# Patient Record
Sex: FEMALE | Race: WHITE | NOT HISPANIC OR LATINO | Employment: OTHER | URBAN - METROPOLITAN AREA
[De-identification: names, ages, dates, MRNs, and addresses within clinical notes are randomized per-mention and may not be internally consistent; named-entity substitution may affect disease eponyms.]

---

## 2017-12-29 ENCOUNTER — OFFICE VISIT (OUTPATIENT)
Dept: GYNECOLOGIC ONCOLOGY | Facility: CLINIC | Age: 72
End: 2017-12-29
Payer: MEDICARE

## 2017-12-29 VITALS
DIASTOLIC BLOOD PRESSURE: 68 MMHG | WEIGHT: 138 LBS | HEART RATE: 63 BPM | BODY MASS INDEX: 20.98 KG/M2 | SYSTOLIC BLOOD PRESSURE: 148 MMHG

## 2017-12-29 DIAGNOSIS — Z93.3 COLOSTOMY STATUS: ICD-10-CM

## 2017-12-29 DIAGNOSIS — Z01.419 WELL WOMAN EXAM WITH ROUTINE GYNECOLOGICAL EXAM: ICD-10-CM

## 2017-12-29 DIAGNOSIS — C52 VAGINAL CANCER: Primary | ICD-10-CM

## 2017-12-29 DIAGNOSIS — Z12.89 ENCOUNTER FOR PELVIC SCREENING FOR CANCER: ICD-10-CM

## 2017-12-29 DIAGNOSIS — K46.9 PERISTOMAL HERNIA: ICD-10-CM

## 2017-12-29 PROCEDURE — 99999 PR PBB SHADOW E&M-EST. PATIENT-LVL III: CPT | Mod: PBBFAC,,, | Performed by: OBSTETRICS & GYNECOLOGY

## 2017-12-29 PROCEDURE — G0101 CA SCREEN;PELVIC/BREAST EXAM: HCPCS | Mod: PBBFAC | Performed by: OBSTETRICS & GYNECOLOGY

## 2017-12-29 PROCEDURE — G0101 CA SCREEN;PELVIC/BREAST EXAM: HCPCS | Mod: S$PBB,,, | Performed by: OBSTETRICS & GYNECOLOGY

## 2017-12-29 PROCEDURE — 99213 OFFICE O/P EST LOW 20 MIN: CPT | Mod: PBBFAC | Performed by: OBSTETRICS & GYNECOLOGY

## 2017-12-29 NOTE — PROGRESS NOTES
Subjective:       Patient ID: Tye Diaz is a 72 y.o. female.    Chief Complaint: Vaginal Cancer (f/u)    HPI     The patient comes in today for followup of her vaginal cancer as well as her annual well woman exam.  Last exam was 2016.      Mammogram: Oct 2016: normal   Colonoscopy: years ago and was incomplete. Sent to [facility] for another colonoscopy that could not be completed due to kink in bowel.         Her oncologic history is:  This is a 66-year-old patient last seen in August 2010. She has a history of vaginal carcinoma treated with radiation therapy. Subsequently she developed a recurrence and underwent a posterior pelvic examination with removal of the vagina, rectum and creation of a neovagina with Gracilis myocutaneous flaps and a colostomy. An attempt was made to save her bladder at that time but subsequently this had to be resected because of poor function and she had a continent conduit done in 1998.     Review of Systems   Constitutional: Negative for chills, fatigue and fever.   Eyes: Negative for visual disturbance.   Respiratory: Negative for cough, shortness of breath and wheezing.    Cardiovascular: Negative for chest pain, palpitations and leg swelling.   Gastrointestinal: Negative for abdominal distention, abdominal pain, constipation, diarrhea, nausea and vomiting.   Genitourinary: Negative for difficulty urinating, dysuria, frequency, genital sores, hematuria, pelvic pain, urgency, vaginal bleeding, vaginal discharge and vaginal pain.   Musculoskeletal: Negative for gait problem and neck stiffness.   Skin: Negative for rash.   Neurological: Negative for seizures and weakness.   Hematological: Negative for adenopathy. Does not bruise/bleed easily.   Psychiatric/Behavioral: The patient is not nervous/anxious.        Objective:   BP (!) 148/68   Pulse 63   Wt 62.6 kg (138 lb)   BMI 20.98 kg/m²      Physical Exam   Constitutional: She is oriented to person, place, and time. She  appears well-developed and well-nourished.   HENT:   Head: Normocephalic and atraumatic.   Eyes: No scleral icterus.   Neck: No tracheal deviation present. No thyroid mass and no thyromegaly present.   Cardiovascular: Normal rate and regular rhythm.    Pulmonary/Chest: Effort normal and breath sounds normal. She has no wheezes. Right breast exhibits no mass, no nipple discharge, no skin change and no tenderness. Left breast exhibits no mass, no nipple discharge, no skin change and no tenderness.   Abdominal: She exhibits no distension and no mass. There is no hepatosplenomegaly. There is no tenderness. There is no rebound and no guarding.   Peristomal hernia for colostomy in LLQ.   Continent conduit stoma in RLQ.    Genitourinary:   Genitourinary Comments: Bimanual exam:  Vulva: surgical changes consistent with neovagina.   Urethra: Normal size and location. No lesions  Bladder: absent   Vagina: neovagina   Cervix: absent.   Uterus: absent.  Adnexa: unable to assess.  Rectal: absent   Musculoskeletal: She exhibits no edema or tenderness.   Lymphadenopathy:     She has no cervical adenopathy.     She has no axillary adenopathy.        Right: No inguinal and no supraclavicular adenopathy present.        Left: No inguinal and no supraclavicular adenopathy present.   Neurological: She is alert and oriented to person, place, and time.   Skin: Skin is warm and dry. No rash noted.   Psychiatric: She has a normal mood and affect. Her behavior is normal. Judgment and thought content normal.       Assessment:       1. Vaginal cancer    2. Well woman exam with routine gynecological exam    3. Encounter for pelvic screening for cancer    4. Colostomy status    5. Peristomal hernia        Plan:   Vaginal cancer   MATTIE   RTC in 1 year.     Well woman exam with routine gynecological exam  Counseling time of 10 minutes discussing calcium, vitamin D and exercise. Questions answered.   She will get mammogram in [place]    Encounter for pelvic screening for cancer    Colostomy status    Peristomal hernia  -     Ambulatory consult to Colorectal Surgery

## 2018-01-26 ENCOUNTER — OFFICE VISIT (OUTPATIENT)
Dept: SURGERY | Facility: CLINIC | Age: 73
End: 2018-01-26
Payer: MEDICARE

## 2018-01-26 VITALS
DIASTOLIC BLOOD PRESSURE: 78 MMHG | BODY MASS INDEX: 21.02 KG/M2 | HEART RATE: 57 BPM | SYSTOLIC BLOOD PRESSURE: 157 MMHG | WEIGHT: 138.69 LBS | HEIGHT: 68 IN

## 2018-01-26 DIAGNOSIS — K94.09 COLOSTOMY HERNIA: ICD-10-CM

## 2018-01-26 DIAGNOSIS — K43.5 PARASTOMAL HERNIA WITHOUT OBSTRUCTION OR GANGRENE: Primary | ICD-10-CM

## 2018-01-26 DIAGNOSIS — Z85.44 HISTORY OF CANCER OF VAGINA: ICD-10-CM

## 2018-01-26 PROCEDURE — 1159F MED LIST DOCD IN RCRD: CPT | Mod: ,,, | Performed by: COLON & RECTAL SURGERY

## 2018-01-26 PROCEDURE — 99213 OFFICE O/P EST LOW 20 MIN: CPT | Mod: PBBFAC | Performed by: COLON & RECTAL SURGERY

## 2018-01-26 PROCEDURE — 1126F AMNT PAIN NOTED NONE PRSNT: CPT | Mod: ,,, | Performed by: COLON & RECTAL SURGERY

## 2018-01-26 PROCEDURE — 99203 OFFICE O/P NEW LOW 30 MIN: CPT | Mod: S$PBB,,, | Performed by: COLON & RECTAL SURGERY

## 2018-01-26 PROCEDURE — 99999 PR PBB SHADOW E&M-EST. PATIENT-LVL III: CPT | Mod: PBBFAC,,, | Performed by: COLON & RECTAL SURGERY

## 2018-01-26 NOTE — PROGRESS NOTES
COLORECTAL SURGERY  H&P      REASON FOR CONSULT:    Encounter Diagnoses   Name Primary?    Parastomal hernia without obstruction or gangrene Yes    History of cancer of vagina     Colostomy hernia        SUBJECTIVE:     HISTORY OF PRESENT ILLNESS:  Tye Diaz is a 72 y.o. female who has been referred to surgery clinic for parastomal hernia at site of prior colostomy.    Patient has a history of vaginal carcinoma treated with radiation therapy. Subsequently she developed a recurrence and underwent a posterior pelvic examination with removal of the vagina, rectum and creation of a neovagina with Gracilis myocutaneous flaps and a colostomy. An attempt was made to save her bladder at that time but subsequently this had to be resected because of poor function and she had a continent conduit done in 1998.    She presents to discuss worsening parastomal hernia.  Very much bothered by increasing bulge.  She has gone up a pants size.  No issues with constipation.  Regularly irrigates colostomy.     The patient's past surgical history is pertinent for prior pelvic exenteration with creation of neovagina and left end colostomy.  [place] pouch created 7 months later with catheterizable stoma right lower abdomen.  In 2009, she had overlay Permacol mesh repair by Dr. Moreno.      MEDICATIONS:  Home Medications:  Current Outpatient Prescriptions on File Prior to Visit   Medication Sig Dispense Refill    alprazolam (XANAX) 0.5 MG tablet 0.5 mg nightly as needed. Takes 1/2 tablets prn       amlodipine-benazepril 5-20 mg (LOTREL) 5-20 mg per capsule Take 1 capsule by mouth once daily.      calcium-vitamin D3 (CALCIUM 500 + D) 500 mg(1,250mg) -200 unit per tablet Take 1 tablet by mouth once daily.       No current facility-administered medications on file prior to visit.        ALLERGIES:    Review of patient's allergies indicates:  No Known Allergies    PAST MEDICAL HISTORY:    Past Medical History:   Diagnosis Date     Cancer     vaginal cancer    Cataract     Hypertension     Inflammatory bowel disease     Kidney stone     Peristomal hernia 12/29/2017    Urinary tract infection     Vaginal cancer 1993    radiation treatment for clear cell cancer    Vaginal infection     Vitamin D deficiency disease     Well woman exam with routine gynecological exam 5/12/2016       SURGICAL HISTORY:  Past Surgical History:   Procedure Laterality Date    APPENDECTOMY      BLADDER SURGERY      CATARACT EXTRACTION Bilateral     COLOSTOMY      CYSTOSCOPY      HAND TENDON SURGERY Right     HERNIA REPAIR  8/2009    peristomal     hyperbarics      HYSTERECTOMY  1980's    with unilateral salpingo oophrectomy    KIDNEY STONE SURGERY      OOPHORECTOMY      pelvic exenteration      AL REMOVAL OF OVARY/TUBE(S)      2yrs after judy/uso/other ovary was removed    radiation      SMALL INTESTINE SURGERY      URETERAL STENT PLACEMENT  1998       FAMILY HISTORY:  Family History   Problem Relation Age of Onset    Colon cancer Mother 50    Ovarian cancer Neg Hx     Uterine cancer Neg Hx     Breast cancer Neg Hx        SOCIAL HISTORY:  Social History   Substance Use Topics    Smoking status: Current Some Day Smoker     Packs/day: 0.25     Years: 34.00    Smokeless tobacco: Not on file    Alcohol use 0.6 oz/week     1 Shots of liquor per week      Comment: daily        REVIEW OF SYSTEMS:  A 10-point review of systems is negative except for the above mentioned in the HPI.    OBJECTIVE:     Most Recent Vitals:  Pulse: (!) 57 (01/26/18 1029)  BP: (!) 157/78 (01/26/18 1029)      PHYSICAL EXAM:  AAO, NAD, well developed and well nourished.  Head normocephalic, atraumatic.  Trachea midline, neck supple.  Respirations unlabored with good inspiratory effort.  Heart regular rate and rhythm.  Abdomen soft, nondistended, nontender to palpation.  Reducible bulge at left lower quadrant stoma consistent with parastomal hernia.       LABORATORY  VALUES:  Lab Results   Component Value Date    WBC 7.16 07/22/2009    HGB 13.9 07/22/2009    HCT 41.3 07/22/2009     07/22/2009     Lab Results   Component Value Date     07/22/2009    K 4.2 07/22/2009     07/22/2009    CO2 22 (L) 07/22/2009    BUN 27 (H) 07/22/2009    CREATININE 1.0 05/12/2016    GLU 86 07/22/2009    CALCIUM 10.2 07/22/2009    AST 20 07/22/2009    ALT 16 07/22/2009    ALKPHOS 69 07/22/2009    BILITOT 0.3 07/22/2009    PROT 7.7 07/22/2009    ALBUMIN 4.9 07/22/2009     No results found for: INR, PTT  No results found for: TSH, FREET4, PTH, CEA,       DIAGNOSTIC STUDIES:  Old CT Reviewed    ASSESSMENT:     Tye Diaz is a 72 y.o. female referred for parastomal hernia at left lower quadrant end colostomy.      PLAN:  · Referral to Dr. Montoya for consideration of sugarbaker repair.   · The patient is not on anticoagulation, Aspirin or other antiplatelet medications.      Gladys Powers MD      Orders Placed This Encounter    Ambulatory Referral to General Surgery

## 2018-01-26 NOTE — LETTER
January 31, 2018      Ernesto Avelar MD  [address]  [facility] [address]           [facility]  [address]  [facility] [address]  Phone: [phone]          Patient: Tye Diaz   MR Number: [id number]   YOB: 1945   Date of Visit: 1/26/2018       Dear Dr. Ernesto Avelar:    Thank you for referring Tye Diaz to me for evaluation. Attached you will find relevant portions of my assessment and plan of care.    If you have questions, please do not hesitate to call me. I look forward to following yTe Diaz along with you.    Sincerely,    Eduardo Muniz MD    Enclosure  CC:  No Recipients    If you would like to receive this communication electronically, please contact [email or web address] or [phone] to request more information on [employer] Link access.    For providers and/or their staff who would like to refer a patient to [facility], please contact us through our one-stop-shop provider referral line, [facility], at [phone].    If you feel you have received this communication in error or would no longer like to receive these types of communications, please e-mail [email or web address]

## 2018-02-16 ENCOUNTER — OFFICE VISIT (OUTPATIENT)
Dept: SURGERY | Facility: CLINIC | Age: 73
End: 2018-02-16
Payer: MEDICARE

## 2018-02-16 VITALS
BODY MASS INDEX: 20.38 KG/M2 | SYSTOLIC BLOOD PRESSURE: 155 MMHG | TEMPERATURE: 98 F | WEIGHT: 134.5 LBS | DIASTOLIC BLOOD PRESSURE: 72 MMHG | HEIGHT: 68 IN | HEART RATE: 60 BPM

## 2018-02-16 DIAGNOSIS — K43.5 PARASTOMAL HERNIA WITHOUT OBSTRUCTION OR GANGRENE: Primary | ICD-10-CM

## 2018-02-16 PROCEDURE — 1159F MED LIST DOCD IN RCRD: CPT | Mod: ,,, | Performed by: SURGERY

## 2018-02-16 PROCEDURE — 99999 PR PBB SHADOW E&M-EST. PATIENT-LVL III: CPT | Mod: PBBFAC,,, | Performed by: SURGERY

## 2018-02-16 PROCEDURE — 1126F AMNT PAIN NOTED NONE PRSNT: CPT | Mod: ,,, | Performed by: SURGERY

## 2018-02-16 PROCEDURE — 99204 OFFICE O/P NEW MOD 45 MIN: CPT | Mod: S$PBB,,, | Performed by: SURGERY

## 2018-02-16 PROCEDURE — 99213 OFFICE O/P EST LOW 20 MIN: CPT | Mod: PBBFAC | Performed by: SURGERY

## 2018-02-16 NOTE — PROGRESS NOTES
History & Physical    SUBJECTIVE:     History of Present Illness:  HISTORY OF PRESENT ILLNESS:  Tye Diaz is a 72 y.o. female who has been referred to surgery clinic for parastomal hernia at site of prior colostomy.     Patient has a history of vaginal carcinoma treated with radiation therapy. Subsequently she developed a recurrence and underwent a posterior pelvic surgery with removal of the vagina, rectum and creation of a neovagina with Gracilis myocutaneous flaps and a colostomy. An attempt was made to save her bladder at that time but this had to be resected because of poor function and she had a continent conduit done in 1998.     She presents to discuss parastomal hernia.  Very much bothered by appearance of increasing bulge.  She has gone up a pants size.  No issues with constipation.  Regularly irrigates colostomy. No pain from hernia.  No trouble with bag placement and no obstruction.     The patient's past surgical history is pertinent for prior pelvic exenteration with creation of neovagina and left end colostomy.  [place] pouch created 7 months later with catheterizable stoma right lower abdomen.  In 2009, she had overlay Permacol mesh repair by Dr. Moreno.      Review of patient's allergies indicates:  No Known Allergies    Current Outpatient Prescriptions   Medication Sig Dispense Refill    alprazolam (XANAX) 0.5 MG tablet 0.5 mg nightly as needed. Takes 1/2 tablets prn       amlodipine-benazepril 5-20 mg (LOTREL) 5-20 mg per capsule Take 1 capsule by mouth once daily.      calcium-vitamin D3 (CALCIUM 500 + D) 500 mg(1,250mg) -200 unit per tablet Take 1 tablet by mouth once daily.       No current facility-administered medications for this visit.        Past Medical History:   Diagnosis Date    Cancer     vaginal cancer    Cataract     Hypertension     Inflammatory bowel disease     Kidney stone     Peristomal hernia 12/29/2017    Urinary tract infection     Vaginal cancer 1993     radiation treatment for clear cell cancer    Vaginal infection     Vitamin D deficiency disease     Well woman exam with routine gynecological exam 5/12/2016     Past Surgical History:   Procedure Laterality Date    APPENDECTOMY      BLADDER SURGERY      CATARACT EXTRACTION Bilateral     COLOSTOMY      CYSTOSCOPY      HAND TENDON SURGERY Right     HERNIA REPAIR  8/2009    peristomal     hyperbarics      HYSTERECTOMY  1980's    with unilateral salpingo oophrectomy    KIDNEY STONE SURGERY      OOPHORECTOMY      pelvic exenteration      ND REMOVAL OF OVARY/TUBE(S)      2yrs after judy/uso/other ovary was removed    radiation      SMALL INTESTINE SURGERY      URETERAL STENT PLACEMENT  1998     Family History   Problem Relation Age of Onset    Colon cancer Mother 50    Ovarian cancer Neg Hx     Uterine cancer Neg Hx     Breast cancer Neg Hx      Social History   Substance Use Topics    Smoking status: Current Some Day Smoker     Packs/day: 0.25     Years: 34.00    Smokeless tobacco: Not on file    Alcohol use 0.6 oz/week     1 Shots of liquor per week      Comment: daily        Review of Systems:  Review of Systems   Constitutional: Negative for activity change, appetite change, chills, diaphoresis, fatigue and fever.   HENT: Negative for congestion, postnasal drip, rhinorrhea, sinus pressure, sneezing, sore throat and tinnitus.    Eyes: Negative for photophobia, pain, discharge, redness, itching and visual disturbance.   Respiratory: Negative for apnea, cough, choking, chest tightness, shortness of breath, wheezing and stridor.    Cardiovascular: Negative for chest pain, palpitations and leg swelling.   Gastrointestinal: Negative for abdominal distention, abdominal pain, constipation, diarrhea, nausea and vomiting.   Musculoskeletal: Negative for arthralgias, back pain and joint swelling.   Skin: Negative for color change, pallor and rash.   Neurological: Negative for dizziness, facial  asymmetry, light-headedness, numbness and headaches.   Hematological: Negative for adenopathy.   Psychiatric/Behavioral: Negative for agitation, behavioral problems, confusion and decreased concentration.       OBJECTIVE:     Vital Signs (Most Recent)              Physical Exam:  Physical Exam   Constitutional: She is oriented to person, place, and time. She appears well-developed and well-nourished.   HENT:   Head: Normocephalic and atraumatic.   Right Ear: External ear normal.   Left Ear: External ear normal.   Eyes: Conjunctivae and EOM are normal. Right eye exhibits no discharge. Left eye exhibits no discharge. No scleral icterus.   Neck: Normal range of motion. Neck supple.   Cardiovascular: Normal rate and regular rhythm.    Pulmonary/Chest: Effort normal. No respiratory distress.   Abdominal: Soft. She exhibits no distension and no mass. There is no tenderness. There is no rebound and no guarding. A hernia (parastomal.  Non tender.  Reducibel.) is present.   Musculoskeletal: Normal range of motion. She exhibits no edema or tenderness.   Neurological: She is alert and oriented to person, place, and time.   Skin: Skin is warm and dry. No rash noted. No erythema. No pallor.   Psychiatric: She has a normal mood and affect. Her behavior is normal. Judgment and thought content normal.         ASSESSMENT/PLAN:     Parastomal Hernia    PLAN:Plan     Currently no sig issues requiring surgery  Would be difficult surgery secondary to previous radiation and surgeries with second ostomy on other side.  Discussed this with patient and high rate of recurrence in these parastomal hernias.  Will obtain CT abd/pelvis  Will suggest binder with hole for ostomy appliance.  RTC with any issues or problems.         Lalit Montoya MD

## 2018-02-16 NOTE — LETTER
February 16, 2018      Eduardo Muniz MD  [address] [facility] [address]           [facility]  [address]  [facility] [address]  Phone: [phone]          Patient: Tye Diaz   MR Number: [id number]   YOB: 1945   Date of Visit: 2/16/2018       Dear Dr. Eduardo Muniz:    Thank you for referring Tye Diaz to me for evaluation. Attached you will find relevant portions of my assessment and plan of care.    If you have questions, please do not hesitate to call me. I look forward to following Tye Diaz along with you.    Sincerely,    Lalit Montoya Jr., MD    Enclosure  CC:  No Recipients    If you would like to receive this communication electronically, please contact [email or web address] or [phone] to request more information on [employer] [facility] Link access.    For providers and/or their staff who would like to refer a patient to [facility], please contact us through our one-stop-shop provider referral line, [facility] , at [phone].    If you feel you have received this communication in error or would no longer like to receive these types of communications, please e-mail [email or web address]

## 2018-02-21 ENCOUNTER — TELEPHONE (OUTPATIENT)
Dept: SURGERY | Facility: CLINIC | Age: 73
End: 2018-02-21

## 2018-02-21 NOTE — TELEPHONE ENCOUNTER
Left VM for pt to return phone call in reference to scheduling her CT appt and an appt with the ostomy nurse.

## 2021-01-27 ENCOUNTER — IMMUNIZATION (OUTPATIENT)
Dept: PRIMARY CARE CLINIC | Facility: CLINIC | Age: 76
End: 2021-01-27
Payer: MEDICARE

## 2021-01-27 DIAGNOSIS — Z23 NEED FOR VACCINATION: Primary | ICD-10-CM

## 2021-01-27 PROCEDURE — 91300 COVID-19, MRNA, LNP-S, PF, 30 MCG/0.3 ML DOSE VACCINE: CPT | Mod: S$GLB,,, | Performed by: FAMILY MEDICINE

## 2021-01-27 PROCEDURE — 0001A COVID-19, MRNA, LNP-S, PF, 30 MCG/0.3 ML DOSE VACCINE: ICD-10-PCS | Mod: S$GLB,,, | Performed by: FAMILY MEDICINE

## 2021-01-27 PROCEDURE — 0001A COVID-19, MRNA, LNP-S, PF, 30 MCG/0.3 ML DOSE VACCINE: CPT | Mod: S$GLB,,, | Performed by: FAMILY MEDICINE

## 2021-01-27 PROCEDURE — 91300 COVID-19, MRNA, LNP-S, PF, 30 MCG/0.3 ML DOSE VACCINE: ICD-10-PCS | Mod: S$GLB,,, | Performed by: FAMILY MEDICINE

## 2021-02-17 ENCOUNTER — IMMUNIZATION (OUTPATIENT)
Dept: PRIMARY CARE CLINIC | Facility: CLINIC | Age: 76
End: 2021-02-17
Payer: MEDICARE

## 2021-02-17 DIAGNOSIS — Z23 NEED FOR VACCINATION: Primary | ICD-10-CM

## 2021-02-17 PROCEDURE — 0002A COVID-19, MRNA, LNP-S, PF, 30 MCG/0.3 ML DOSE VACCINE: CPT | Mod: S$GLB,,, | Performed by: FAMILY MEDICINE

## 2021-02-17 PROCEDURE — 0002A COVID-19, MRNA, LNP-S, PF, 30 MCG/0.3 ML DOSE VACCINE: ICD-10-PCS | Mod: S$GLB,,, | Performed by: FAMILY MEDICINE

## 2021-02-17 PROCEDURE — 91300 COVID-19, MRNA, LNP-S, PF, 30 MCG/0.3 ML DOSE VACCINE: CPT | Mod: S$GLB,,, | Performed by: FAMILY MEDICINE

## 2021-02-17 PROCEDURE — 91300 COVID-19, MRNA, LNP-S, PF, 30 MCG/0.3 ML DOSE VACCINE: ICD-10-PCS | Mod: S$GLB,,, | Performed by: FAMILY MEDICINE

## 2021-10-12 ENCOUNTER — IMMUNIZATION (OUTPATIENT)
Dept: FAMILY MEDICINE | Facility: CLINIC | Age: 76
End: 2021-10-12
Payer: MEDICARE

## 2021-10-12 DIAGNOSIS — Z23 NEED FOR VACCINATION: Primary | ICD-10-CM

## 2021-10-12 PROCEDURE — 0003A COVID-19, MRNA, LNP-S, PF, 30 MCG/0.3 ML DOSE VACCINE: CPT | Mod: PBBFAC | Performed by: FAMILY MEDICINE

## 2021-10-12 PROCEDURE — 91300 COVID-19, MRNA, LNP-S, PF, 30 MCG/0.3 ML DOSE VACCINE: CPT | Mod: PBBFAC

## 2022-04-30 ENCOUNTER — HOSPITAL ENCOUNTER (EMERGENCY)
Facility: HOSPITAL | Age: 77
Discharge: HOME | End: 2022-04-30
Attending: EMERGENCY MEDICINE
Payer: MEDICARE

## 2022-04-30 VITALS
SYSTOLIC BLOOD PRESSURE: 108 MMHG | BODY MASS INDEX: 21.82 KG/M2 | DIASTOLIC BLOOD PRESSURE: 59 MMHG | TEMPERATURE: 98 F | RESPIRATION RATE: 15 BRPM | WEIGHT: 144 LBS | OXYGEN SATURATION: 96 % | HEIGHT: 68 IN | HEART RATE: 62 BPM
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
## 2022-04-30 DIAGNOSIS — S43.401A SPRAIN OF RIGHT SHOULDER, UNSPECIFIED SHOULDER SPRAIN TYPE, INITIAL ENCOUNTER: Primary | ICD-10-CM

## 2022-04-30 PROCEDURE — 99284 EMERGENCY DEPT VISIT MOD MDM: CPT | Mod: 25

## 2022-04-30 PROCEDURE — 25000003 PHARM REV CODE 250: Performed by: EMERGENCY MEDICINE

## 2022-04-30 PROCEDURE — 73030 X-RAY EXAM OF SHOULDER: CPT | Mod: 26,RT,, | Performed by: RADIOLOGY

## 2022-04-30 PROCEDURE — 73030 XR SHOULDER COMPLETE 2 OR MORE VIEWS RIGHT: ICD-10-PCS | Mod: 26,RT,, | Performed by: RADIOLOGY

## 2022-04-30 PROCEDURE — 73030 X-RAY EXAM OF SHOULDER: CPT | Mod: TC,FY,RT

## 2022-04-30 RX ORDER — HYDROCODONE BITARTRATE AND ACETAMINOPHEN 5; 325 MG/1; MG/1
1 TABLET ORAL EVERY 8 HOURS PRN
Qty: 12 TABLET | Refills: 0 | Status: SHIPPED | OUTPATIENT
Start: 2022-04-30

## 2022-04-30 RX ORDER — IBUPROFEN 400 MG/1
400 TABLET ORAL
Status: COMPLETED | OUTPATIENT
Start: 2022-04-30 | End: 2022-04-30

## 2022-04-30 RX ORDER — PREGABALIN 100 MG/1
100 CAPSULE ORAL 2 TIMES DAILY
COMMUNITY
Start: 2022-04-12

## 2022-04-30 RX ORDER — MORPHINE SULFATE 4 MG/ML
6 INJECTION, SOLUTION INTRAMUSCULAR; INTRAVENOUS
Status: DISCONTINUED | OUTPATIENT
Start: 2022-04-30 | End: 2022-04-30

## 2022-04-30 RX ORDER — HYDROCODONE BITARTRATE AND ACETAMINOPHEN 5; 325 MG/1; MG/1
1 TABLET ORAL
Status: COMPLETED | OUTPATIENT
Start: 2022-04-30 | End: 2022-04-30

## 2022-04-30 RX ORDER — GABAPENTIN 100 MG/1
CAPSULE ORAL
COMMUNITY

## 2022-04-30 RX ORDER — LEVOTHYROXINE SODIUM 50 UG/1
50 TABLET ORAL DAILY
COMMUNITY
Start: 2022-02-25

## 2022-04-30 RX ORDER — NAPROXEN 375 MG/1
375 TABLET ORAL 2 TIMES DAILY WITH MEALS
Qty: 14 TABLET | Refills: 0 | Status: SHIPPED | OUTPATIENT
Start: 2022-04-30 | End: 2022-05-07

## 2022-04-30 RX ADMIN — IBUPROFEN 400 MG: 400 TABLET, FILM COATED ORAL at 01:04

## 2022-04-30 RX ADMIN — HYDROCODONE BITARTRATE AND ACETAMINOPHEN 1 TABLET: 5; 325 TABLET ORAL at 01:04

## 2022-04-30 NOTE — ED PROVIDER NOTES
CHIEF COMPLAINT    Chief Complaint   Patient presents with    Shoulder Pain     S/p trip and fall off of one step at aprox 2000       HPI    Tye Diaz is a 77 y.o. female who presents after a trip and fall tonight around 8:00 p.m. where she fell and hurt her right shoulder.  Complaining of pain mainly in the right upper arm and shoulder.  Denies any her head, no loss of conscious.  Denies any neck pain, chest pain, shortness of breath, abdominal pain, back pain, hip pain.  Denies any significant pain in the right wrist or elbow.  The severity of the symptoms is moderate.    CURRENT MEDICATIONS    Prior to Admission medications    Medication Sig Start Date End Date Taking? Authorizing Provider   amlodipine-benazepril 5-20 mg (LOTREL) 5-20 mg per capsule Take 1 capsule by mouth once daily.   Yes Historical Provider   calcium-vitamin D3 (OS-BLOSSOM 500 + D3) 500 mg-5 mcg (200 unit) per tablet Take 1 tablet by mouth once daily.   Yes Historical Provider   gabapentin (NEURONTIN) 100 MG capsule gabapentin 100 mg capsule   Take 1 capsule 3 times a day by oral route.   Yes Historical Provider   pregabalin (LYRICA) 100 MG capsule Take 100 mg by mouth 2 (two) times daily. 4/12/22  Yes Historical Provider   SYNTHROID 50 mcg tablet Take 50 mcg by mouth once daily. 2/25/22  Yes Historical Provider   alprazolam (XANAX) 0.5 MG tablet 0.5 mg nightly as needed. Takes 1/2 tablets prn    Historical Provider       ALLERGIES    Review of patient's allergies indicates:  No Known Allergies    PAST MEDICAL HISTORY  Past Medical History:   Diagnosis Date    Cancer     vaginal cancer    Cataract     Hypertension     Inflammatory bowel disease     Kidney stone     Peristomal hernia 12/29/2017    Urinary tract infection     Vaginal cancer 1993    radiation treatment for clear cell cancer    Vaginal infection     Vitamin D deficiency disease     Well woman exam with routine gynecological exam 5/12/2016       SURGICAL HISTORY     Past Surgical History:   Procedure Laterality Date    APPENDECTOMY      BLADDER SURGERY      CATARACT EXTRACTION Bilateral     COLOSTOMY      CYSTOSCOPY      HAND TENDON SURGERY Right     HERNIA REPAIR  8/2009    peristomal     hyperbarics      HYSTERECTOMY  1980's    with unilateral salpingo oophrectomy    KIDNEY STONE SURGERY      OOPHORECTOMY      pelvic exenteration      PA REMOVAL OF OVARY/TUBE(S)      2yrs after judy/uso/other ovary was removed    radiation      SMALL INTESTINE SURGERY      URETERAL STENT PLACEMENT  1998       SOCIAL HISTORY    Social History     Socioeconomic History    Marital status:    Tobacco Use    Smoking status: Current Some Day Smoker     Packs/day: 0.25     Years: 34.00     Pack years: 8.50    Smokeless tobacco: Never Used   Substance and Sexual Activity    Alcohol use: Yes     Alcohol/week: 1.0 standard drink     Types: 1 Shots of liquor per week     Comment: daily    Drug use: No       FAMILY HISTORY    Family History   Problem Relation Age of Onset    Colon cancer Mother 50    Ovarian cancer Neg Hx     Uterine cancer Neg Hx     Breast cancer Neg Hx        REVIEW OF SYSTEMS    Constitutional:  No reported fever, chills, weight loss or weakness.   Eyes:  No reported photophobia or discharge. No visual changes  HENT:  No reported sore throat or ear pain.   Respiratory:  No reported cough or shortness of breath.   Cardiovascular:  No reported chest pain, palpitations or swelling.    GI:  No reported abdominal pain, nausea, vomiting, or diarrhea.   Musculoskeletal:  No reported back pain.   Skin:  No reported rash.   Neurologic:  No reported headache, focal weakness or sensory changes.   Endocrine:  No reported polyuria or polydypsia.   Lymphatic:  No reported swollen glands.   Psychiatric:  No reported depression, suicidal ideation or homicidal ideation.   All Systems otherwise negative except as noted in the Review of Systems and History of Present  "Illness.    PHYSICAL EXAM    VITAL SIGNS: /74 (BP Location: Left arm, Patient Position: Lying)   Pulse 62   Temp 98.1 °F (36.7 °C) (Oral)   Resp 17   Ht 5' 8" (1.727 m)   Wt 65.3 kg (144 lb)   SpO2 97%   Breastfeeding No   BMI 21.90 kg/m²    Constitutional:  Well developed, Well nourished who appears stated age, No acute distress, Non-toxic appearance.   HENT:  Normocephalic, Atraumatic, Moist mucus membranes, No oral exudates or ulcerations, Nares patent,  Normal appearing turbinates.  Neck- Normal range of motion, No tenderness, Supple, No stridor. No meningismus  Eyes:  PERRL, EOMI, Conjunctiva normal, Anicteric sclera  Respiratory: Breath sounds clear to auscultation bilaterally, No respiratory distress, No wheezing, No chest tenderness.   Cardiovascular:  Normal heart rate, Normal rhythm, No murmurs, No rubs, No gallops.   Musculoskeletal:  Intact distal pulses, No edema, No cyanosis, No clubbing. Good range of motion in all major joints. No major deformities noted.  Moderate tenderness to palpation over the right anterior shoulder, limited range of motion secondary to pain.  No significant tenderness or pain with range of motion of the right elbow or wrist.  Integument:  Warm, Dry, No erythema, No rash.   Psychiatric:  Affect normal, Judgment normal, Mood normal.     LABS  Pertinent labs reviewed. (See chart for details)   Labs Reviewed - No data to display    RADIOLOGY    Imaging Results          X-ray Shoulder 2 or More Views Right (In process)  Result time 04/30/22 00:37:05   Procedure changed from X-Ray Shoulder Trauma Right               X-rays of the right shoulder interpreted by myself shows no gross fracture or dislocation.    ED COURSE & MEDICAL DECISION MAKING    Medications   HYDROcodone-acetaminophen 5-325 mg per tablet 1 tablet (has no administration in time range)   ibuprofen tablet 400 mg (has no administration in time range)       The patient presents with the history as noted " above. The differential diagnosis would include but is not limited to:  Contusion, sprain, fracture, dislocation     Patient presents with right shoulder injury after a fall.  X-rays do not show any gross fracture or dislocation.  Patient placed in a sling for support temporarily and referred to Orthopedics for outpatient follow-up.  Discharged short course of pain medication.  ED return precautions given the patient.       FINAL IMPRESSION    1. Sprain of right shoulder, unspecified shoulder sprain type, initial encounter    2. Fall          Aditya Tamayo    The patient was discharged to home with the following prescriptions:   New Prescriptions    HYDROCODONE-ACETAMINOPHEN (NORCO) 5-325 MG PER TABLET    Take 1 tablet by mouth every 8 (eight) hours as needed for Pain.    NAPROXEN (NAPROSYN) 375 MG TABLET    Take 1 tablet (375 mg total) by mouth 2 (two) times daily with meals. for 7 days       I stressed the importance of close follow-up with:  Florentin Flood MD  [address] [place] [address]  [phone]    In 3 days      [place] - Emergency Dept  [address] [facility] [address]  [phone]    As needed, If symptoms worsen    Stef Palmer,   [address] [facility] [place] [address]  [phone]    In 1 week  As needed, If symptoms worsen      I discussed the differential diagnosis, treatment plan, and strict return precautions for any worsening symptoms or new concerning symptoms, and they stated understanding.        **Parts of this note were created using [employer] Voice Recognition software program. While efforts were made to correct any mistakes made by this voice recognition software program, nonsensical phrases may remain in this note.       Aditya Tamayo,   04/30/22 0127

## 2022-04-30 NOTE — ED NOTES
Pt arrived to ED via EMS with c/o right shoulder pain s/p slip and fall off of one step onto [place] at aprox 2000. Pt denies and head or neck trauma or LOC. Pt states she was able to get up after the fall and go home, change clothes, after a few hours she noticed she wasn't able to lift her right arm d/t shoulder pain. Pt applied ice PTA. Pt is aaox3, NAD noted - pt resting in stretcher, side rails up x2.

## 2022-04-30 NOTE — DISCHARGE INSTRUCTIONS
Ice to the shoulder as needed for pain.  Over-the-counter Tylenol for mild pain.  Use sling for support, did not keep it on all the time.

## 2022-04-30 NOTE — ED NOTES
Pt states she does not have her phone or know any phone numbers. Pt states she will call a cab to get back home. Pt placed in the lobby while she waits for a cab.

## 2023-05-29 ENCOUNTER — TELEPHONE (OUTPATIENT)
Dept: FAMILY MEDICINE | Facility: CLINIC | Age: 78
End: 2023-05-29
Payer: MEDICARE

## 2023-05-29 NOTE — TELEPHONE ENCOUNTER
Called pt to let them know that Dr. Kaufman will be leaving the practice. Offering them to see another provider to establish care or they can come in if they still need to be seen.   LVM

## 2024-04-30 ENCOUNTER — TELEPHONE (OUTPATIENT)
Dept: SURGERY | Facility: CLINIC | Age: 79
End: 2024-04-30
Payer: MEDICARE

## 2024-04-30 NOTE — TELEPHONE ENCOUNTER
Spoke with patient, referral is for parastomal hernia. Informed patient that I think she would be referred to general surgery, but I will double check with Dr Muniz and call her back.

## 2024-04-30 NOTE — TELEPHONE ENCOUNTER
----- Message from Priti Rocha sent at 4/30/2024 12:28 PM CDT -----  Regarding: Referral  Hello,    Provider Sharmila Javed NP  would like to refer the patient to Dr. Muniz.      The patients diagnosis is: parasternal hernia w/o obstruction or gangrene [K43.5]    I have scanned the patients referral and records into .     Please review and contact patient to schedule appointment. If there are no appointments available at this time, please advise and I will inform the referring provider/clinic      Thank you,   [facility]  Favio Moody

## 2024-06-13 ENCOUNTER — OFFICE VISIT (OUTPATIENT)
Dept: PODIATRY | Facility: CLINIC | Age: 79
End: 2024-06-13
Payer: MEDICARE

## 2024-06-13 VITALS
SYSTOLIC BLOOD PRESSURE: 134 MMHG | DIASTOLIC BLOOD PRESSURE: 75 MMHG | HEIGHT: 68 IN | HEART RATE: 59 BPM | BODY MASS INDEX: 21.67 KG/M2 | WEIGHT: 143 LBS

## 2024-06-13 DIAGNOSIS — M72.2 PLANTAR FASCIITIS: Primary | ICD-10-CM

## 2024-06-13 PROCEDURE — 99999 PR PBB SHADOW E&M-EST. PATIENT-LVL IV: CPT | Mod: PBBFAC,,, | Performed by: PODIATRIST

## 2024-06-13 RX ORDER — METHENAMINE HIPPURATE 1000 MG/1
TABLET ORAL
COMMUNITY
Start: 2023-12-27

## 2024-06-13 RX ORDER — BETAMETHASONE SODIUM PHOSPHATE AND BETAMETHASONE ACETATE 3; 3 MG/ML; MG/ML
12 INJECTION, SUSPENSION INTRA-ARTICULAR; INTRALESIONAL; INTRAMUSCULAR; SOFT TISSUE
Status: COMPLETED | OUTPATIENT
Start: 2024-06-13 | End: 2024-06-13

## 2024-06-13 RX ORDER — PREGABALIN 150 MG/1
150 CAPSULE ORAL
COMMUNITY
Start: 2024-04-08

## 2024-06-13 RX ORDER — BENAZEPRIL HYDROCHLORIDE AND HYDROCHLOROTHIAZIDE 20; 12.5 MG/1; MG/1
1 TABLET ORAL
COMMUNITY
Start: 2024-02-20

## 2024-06-13 RX ORDER — NAPROXEN 375 MG/1
TABLET ORAL
COMMUNITY
Start: 2023-10-17 | End: 2024-06-13

## 2024-06-13 RX ORDER — DICLOFENAC SODIUM 75 MG/1
75 TABLET, DELAYED RELEASE ORAL 2 TIMES DAILY
Qty: 60 TABLET | Refills: 0 | Status: SHIPPED | OUTPATIENT
Start: 2024-06-13 | End: 2024-07-13

## 2024-06-13 RX ORDER — CYCLOBENZAPRINE HCL 5 MG
TABLET ORAL
COMMUNITY
Start: 2024-01-23

## 2024-06-13 RX ORDER — KETOROLAC TROMETHAMINE 30 MG/ML
60 INJECTION, SOLUTION INTRAMUSCULAR; INTRAVENOUS
Status: COMPLETED | OUTPATIENT
Start: 2024-06-13 | End: 2024-06-13

## 2024-06-13 RX ADMIN — BETAMETHASONE SODIUM PHOSPHATE AND BETAMETHASONE ACETATE 12 MG: 3; 3 INJECTION, SUSPENSION INTRA-ARTICULAR; INTRALESIONAL; INTRAMUSCULAR; SOFT TISSUE at 01:06

## 2024-06-13 RX ADMIN — KETOROLAC TROMETHAMINE 60 MG: 30 INJECTION, SOLUTION INTRAMUSCULAR; INTRAVENOUS at 01:06

## 2024-06-16 NOTE — PROGRESS NOTES
Subjective:       Patient ID: Tye Diaz is a 79 y.o. female.    Chief Complaint: Heel Pain  Patient presents today with a complaint of bilateral heel pain she states this is definitely worse at night.  Patient states this has been going on for about 2-3 weeks her right foot is worse than her left she states she can not stand for any period of time.  Patient states this 1st started several months ago but it has only the past few weeks where this has gotten progressively worse she does relate a history of a fall which caused a pelvis fracture 2-1/2 years ago she is currently doing physical therapy.  Patient states she has good supportive shoes she has not sure why she is having so much pain in her heel.    Past Medical History:   Diagnosis Date    Cancer     vaginal cancer    Cataract     Hypertension     Inflammatory bowel disease     Kidney stone     Peristomal hernia 12/29/2017    Urinary tract infection     Vaginal cancer 1993    radiation treatment for clear cell cancer    Vaginal infection     Vitamin D deficiency disease     Well woman exam with routine gynecological exam 5/12/2016     Past Surgical History:   Procedure Laterality Date    APPENDECTOMY      BLADDER SURGERY      CATARACT EXTRACTION Bilateral     COLOSTOMY      CYSTOSCOPY      HAND TENDON SURGERY Right     HERNIA REPAIR  8/2009    peristomal     hyperbarics      HYSTERECTOMY  1980's    with unilateral salpingo oophrectomy    KIDNEY STONE SURGERY      OOPHORECTOMY      pelvic exenteration      NM REMOVAL OF OVARY/TUBE(S)      2yrs after judy/uso/other ovary was removed    radiation      SMALL INTESTINE SURGERY      URETERAL STENT PLACEMENT  1998     Family History   Problem Relation Name Age of Onset    Colon cancer Mother  50    Ovarian cancer Neg Hx      Uterine cancer Neg Hx      Breast cancer Neg Hx       Social History     Socioeconomic History    Marital status:    Tobacco Use    Smoking status: Some Days     Current packs/day:  "0.25     Average packs/day: 0.3 packs/day for 34.0 years (8.5 ttl pk-yrs)     Types: Cigarettes    Smokeless tobacco: Never   Substance and Sexual Activity    Alcohol use: Yes     Alcohol/week: 1.0 standard drink of alcohol     Types: 1 Shots of liquor per week     Comment: daily    Drug use: No       Current Outpatient Medications   Medication Sig Dispense Refill    alprazolam (XANAX) 0.5 MG tablet 0.5 mg nightly as needed. Takes 1/2 tablets prn      benazepril-hydrochlorthiazide (LOTENSIN HCT) 20-12.5 mg per tablet Take 1 tablet by mouth.      calcium-vitamin D3 (OS-BLOSSOM 500 + D3) 500 mg-5 mcg (200 unit) per tablet Take 1 tablet by mouth once daily.      cyclobenzaprine (FLEXERIL) 5 MG tablet       HYDROcodone-acetaminophen (NORCO) 5-325 mg per tablet Take 1 tablet by mouth every 8 (eight) hours as needed for Pain. 12 tablet 0    methenamine (HIPREX) 1 gram Tab       pregabalin (LYRICA) 150 MG capsule 150 mg.      SYNTHROID 50 mcg tablet Take 50 mcg by mouth once daily.      diclofenac (VOLTAREN) 75 MG EC tablet Take 1 tablet (75 mg total) by mouth 2 (two) times daily. 60 tablet 0     No current facility-administered medications for this visit.     Review of patient's allergies indicates:  No Known Allergies    Review of Systems   Musculoskeletal:  Positive for arthralgias.   All other systems reviewed and are negative.      Objective:      Vitals:    06/13/24 1304   BP: 134/75   BP Location: Right arm   Patient Position: Sitting   Pulse: (!) 59   Weight: 64.9 kg (143 lb)   Height: 5' 8" (1.727 m)     Physical Exam  Vitals and nursing note reviewed.   Constitutional:       Appearance: Normal appearance.   Cardiovascular:      Pulses:           Dorsalis pedis pulses are 1+ on the right side and 1+ on the left side.        Posterior tibial pulses are 1+ on the right side and 1+ on the left side.   Pulmonary:      Effort: Pulmonary effort is normal.   Musculoskeletal:         General: Swelling and tenderness present. "        Feet:    Feet:      Right foot:      Protective Sensation: 3 sites tested.  3 sites sensed.      Skin integrity: Erythema and warmth present.      Left foot:      Protective Sensation: 3 sites tested.  3 sites sensed.      Skin integrity: Erythema and warmth present.   Skin:     Capillary Refill: Capillary refill takes 2 to 3 seconds.      Findings: Erythema present.   Neurological:      General: No focal deficit present.      Mental Status: She is alert.   Psychiatric:         Mood and Affect: Mood normal.         Behavior: Behavior normal.                                  Assessment:       1. Plantar fasciitis        Plan:       Patient presents today with a complaint of bilateral heel pain she states this is definitely worse at night.  Patient states this has been going on for about 2-3 weeks her right foot is worse than her left she states she can not stand for any period of time.  Patient states this 1st started several months ago but it has only the past few weeks where this has gotten progressively worse she does relate a history of a fall which caused a pelvis fracture 2-1/2 years ago she is currently doing physical therapy.  Patient states she has good supportive shoes she has not sure why she is having so much pain in her heel.  A comprehensive new patient evaluation was performed today initially the patient was claiming that she had pain in the back of her heel along the Achilles tendon however upon further evaluation the pain is on the plantar surface of the heel at the plantar fascial insertion bilateral.  Patient does have findings consistent with plantar fasciitis right greater than left patient has significantly elevated arches both weight-bearing and nonweightbearing bilateral.  I did advised the patient clearly the plantar fascia is inflamed it is very tight she is having significant pain on 1st steps out of bed in the morning or after she has been sitting for short period of time I did  explain to the patient how she has not getting appropriate support a causes inflammation of the plantar fascia some of the discomfort can travel up the posterior aspect of the heel to the Achilles tendon insertion however at this point the majority of her discomfort is at the plantar fascial insertion.  Patient should discontinue any barefoot walking wear appropriate shoes at all times I did dispense the patient small blue arch pads which I put on her shoes I gave her additional small blue arch pads she is to use these at all times of weight-bearing I have also started the patient on diclofenac she was administered an IM injection of Celestone right side IM injection of Toradol left I do want see the patient for follow-up in 2-3 weeks I want to re-evaluate how she is doing how she tolerates the blue arch pads we may have to look at something more aggressive regarding arch support depending upon how she does with the blue arch pads.  Patient advised she will be doing better at follow-up we just need to determine how much better she has been advised to contact us with any problems questions or concerns prior to scheduled follow-up.  Etiology and pathology behind plantar fasciitis was discussed at length and in detail with the patient.This note was created using [employer] voice recognition software that occasionally misinterpreted phrases or words.

## 2024-06-27 ENCOUNTER — OFFICE VISIT (OUTPATIENT)
Dept: PODIATRY | Facility: CLINIC | Age: 79
End: 2024-06-27
Payer: MEDICARE

## 2024-06-27 VITALS
HEART RATE: 67 BPM | BODY MASS INDEX: 21.68 KG/M2 | SYSTOLIC BLOOD PRESSURE: 120 MMHG | HEIGHT: 68 IN | WEIGHT: 143.06 LBS | DIASTOLIC BLOOD PRESSURE: 75 MMHG

## 2024-06-27 DIAGNOSIS — M72.2 PLANTAR FASCIITIS: Primary | ICD-10-CM

## 2024-06-27 PROCEDURE — 3288F FALL RISK ASSESSMENT DOCD: CPT | Mod: CPTII,S$GLB,, | Performed by: PODIATRIST

## 2024-06-27 PROCEDURE — 1125F AMNT PAIN NOTED PAIN PRSNT: CPT | Mod: CPTII,S$GLB,, | Performed by: PODIATRIST

## 2024-06-27 PROCEDURE — 1101F PT FALLS ASSESS-DOCD LE1/YR: CPT | Mod: CPTII,S$GLB,, | Performed by: PODIATRIST

## 2024-06-27 PROCEDURE — 3074F SYST BP LT 130 MM HG: CPT | Mod: CPTII,S$GLB,, | Performed by: PODIATRIST

## 2024-06-27 PROCEDURE — 99999 PR PBB SHADOW E&M-EST. PATIENT-LVL III: CPT | Mod: PBBFAC,,, | Performed by: PODIATRIST

## 2024-06-27 PROCEDURE — 1160F RVW MEDS BY RX/DR IN RCRD: CPT | Mod: CPTII,S$GLB,, | Performed by: PODIATRIST

## 2024-06-27 PROCEDURE — 99213 OFFICE O/P EST LOW 20 MIN: CPT | Mod: S$GLB,,, | Performed by: PODIATRIST

## 2024-06-27 PROCEDURE — 3078F DIAST BP <80 MM HG: CPT | Mod: CPTII,S$GLB,, | Performed by: PODIATRIST

## 2024-06-27 PROCEDURE — 1159F MED LIST DOCD IN RCRD: CPT | Mod: CPTII,S$GLB,, | Performed by: PODIATRIST

## 2024-06-30 NOTE — PROGRESS NOTES
Subjective:       Patient ID: Tye Diaz is a 79 y.o. female.    Chief Complaint: No chief complaint on file.  Presents today for follow-up plantar fasciitis bilateral.  Past Medical History:   Diagnosis Date    Cancer     vaginal cancer    Cataract     Hypertension     Inflammatory bowel disease     Kidney stone     Peristomal hernia 12/29/2017    Urinary tract infection     Vaginal cancer 1993    radiation treatment for clear cell cancer    Vaginal infection     Vitamin D deficiency disease     Well woman exam with routine gynecological exam 5/12/2016     Past Surgical History:   Procedure Laterality Date    APPENDECTOMY      BLADDER SURGERY      CATARACT EXTRACTION Bilateral     COLOSTOMY      CYSTOSCOPY      HAND TENDON SURGERY Right     HERNIA REPAIR  8/2009    peristomal     hyperbarics      HYSTERECTOMY  1980's    with unilateral salpingo oophrectomy    KIDNEY STONE SURGERY      OOPHORECTOMY      pelvic exenteration      PA REMOVAL OF OVARY/TUBE(S)      2yrs after judy/uso/other ovary was removed    radiation      SMALL INTESTINE SURGERY      URETERAL STENT PLACEMENT  1998     Family History   Problem Relation Name Age of Onset    Colon cancer Mother  50    Ovarian cancer Neg Hx      Uterine cancer Neg Hx      Breast cancer Neg Hx       Social History     Socioeconomic History    Marital status:    Tobacco Use    Smoking status: Some Days     Current packs/day: 0.25     Average packs/day: 0.3 packs/day for 34.0 years (8.5 ttl pk-yrs)     Types: Cigarettes    Smokeless tobacco: Never   Substance and Sexual Activity    Alcohol use: Yes     Alcohol/week: 1.0 standard drink of alcohol     Types: 1 Shots of liquor per week     Comment: daily    Drug use: No       Current Outpatient Medications   Medication Sig Dispense Refill    alprazolam (XANAX) 0.5 MG tablet 0.5 mg nightly as needed. Takes 1/2 tablets prn      benazepril-hydrochlorthiazide (LOTENSIN HCT) 20-12.5 mg per tablet Take 1 tablet by  "mouth.      calcium-vitamin D3 (OS-BLOSSOM 500 + D3) 500 mg-5 mcg (200 unit) per tablet Take 1 tablet by mouth once daily.      cyclobenzaprine (FLEXERIL) 5 MG tablet       diclofenac (VOLTAREN) 75 MG EC tablet Take 1 tablet (75 mg total) by mouth 2 (two) times daily. 60 tablet 0    HYDROcodone-acetaminophen (NORCO) 5-325 mg per tablet Take 1 tablet by mouth every 8 (eight) hours as needed for Pain. 12 tablet 0    methenamine (HIPREX) 1 gram Tab       pregabalin (LYRICA) 150 MG capsule 150 mg.      SYNTHROID 50 mcg tablet Take 50 mcg by mouth once daily.       No current facility-administered medications for this visit.     Review of patient's allergies indicates:  No Known Allergies    Review of Systems   Musculoskeletal:  Positive for arthralgias.   All other systems reviewed and are negative.      Objective:      Vitals:    06/27/24 0806   BP: 120/75   BP Location: Right arm   Patient Position: Sitting   Pulse: 67   Weight: 64.9 kg (143 lb 1.3 oz)   Height: 5' 8" (1.727 m)     Physical Exam  Vitals and nursing note reviewed.   Constitutional:       Appearance: Normal appearance.   Cardiovascular:      Pulses:           Dorsalis pedis pulses are 1+ on the right side and 1+ on the left side.        Posterior tibial pulses are 1+ on the right side and 1+ on the left side.   Pulmonary:      Effort: Pulmonary effort is normal.   Musculoskeletal:         General: Swelling and tenderness present.        Feet:    Feet:      Right foot:      Protective Sensation: 3 sites tested.  3 sites sensed.      Skin integrity: Erythema and warmth present.      Left foot:      Protective Sensation: 3 sites tested.  3 sites sensed.      Skin integrity: Erythema and warmth present.   Skin:     Capillary Refill: Capillary refill takes 2 to 3 seconds.      Findings: Erythema present.   Neurological:      General: No focal deficit present.      Mental Status: She is alert.   Psychiatric:         Mood and Affect: Mood normal.         Behavior: " Behavior normal.                                                        Assessment:       1. Plantar fasciitis        Plan:       Patient presents for follow-up plantar fasciitis bilateral.  Patient states in her own words she has a 1000% better she states her pain is completely resolved she has currently taking the diclofenac as directed she is doing better even with out the blue arch pads patient states that she lost the additional blue arch pads that I gave her so she has only got the ones that I had put in her shoes I did advised the patient we need to continue using the blue arch pads at all times this extra support is the key to keeping her plantar fasciitis from returning.  Patient was dispensed additional blue arch pads she understands where she is supposed to put these in her shoes I did advised the patient if she has any of her symptoms it start to return increased pain discomfort tightness in the heel to contact us immediately as we may have to look at something more aggressive regarding arch support but as of right now she is doing great she is feeling great I will follow up with her as needed.  Patient advised I would recommend continuing to take the diclofenac just for another couple of weeks as she continues to improve.  This note was created using [employer] voice recognition software that occasionally misinterpreted phrases or words.

## 2024-07-17 ENCOUNTER — TELEPHONE (OUTPATIENT)
Dept: PODIATRY | Facility: CLINIC | Age: 79
End: 2024-07-17
Payer: MEDICARE

## 2024-07-17 NOTE — TELEPHONE ENCOUNTER
Spoke with patient over the phone. Patient is experiencing a burning pain in her feet, but stated that the diclofenac 75 mg tablets help some. Patient is requesting a prescription refill sent to [facility] in [place].     Patient is scheduled for July 30th at 10:45 a.m. in [place].   Her last office visit was 06/27/2024.    CHAGO Saunders 07/17/2024

## 2024-07-18 RX ORDER — DICLOFENAC SODIUM 75 MG/1
75 TABLET, DELAYED RELEASE ORAL 2 TIMES DAILY
Qty: 60 TABLET | Refills: 0 | Status: SHIPPED | OUTPATIENT
Start: 2024-07-18 | End: 2024-08-17

## 2024-07-18 NOTE — TELEPHONE ENCOUNTER
Notified pt that refill was sent to [facility] in [place]. Pt verbalzied understanding. No questions or concerns. CHAGO Saunders 07/18/2024

## 2024-07-30 ENCOUNTER — OFFICE VISIT (OUTPATIENT)
Dept: PODIATRY | Facility: CLINIC | Age: 79
End: 2024-07-30
Payer: MEDICARE

## 2024-07-30 VITALS
HEART RATE: 64 BPM | DIASTOLIC BLOOD PRESSURE: 76 MMHG | SYSTOLIC BLOOD PRESSURE: 131 MMHG | BODY MASS INDEX: 21.68 KG/M2 | WEIGHT: 143.06 LBS | HEIGHT: 68 IN

## 2024-07-30 DIAGNOSIS — D21.20 FIBROMA OF FOOT, UNSPECIFIED LATERALITY: ICD-10-CM

## 2024-07-30 DIAGNOSIS — M72.2 PLANTAR FASCIITIS: Primary | ICD-10-CM

## 2024-07-30 PROCEDURE — 1159F MED LIST DOCD IN RCRD: CPT | Mod: CPTII,S$GLB,, | Performed by: PODIATRIST

## 2024-07-30 PROCEDURE — 3078F DIAST BP <80 MM HG: CPT | Mod: CPTII,S$GLB,, | Performed by: PODIATRIST

## 2024-07-30 PROCEDURE — 1160F RVW MEDS BY RX/DR IN RCRD: CPT | Mod: CPTII,S$GLB,, | Performed by: PODIATRIST

## 2024-07-30 PROCEDURE — 3288F FALL RISK ASSESSMENT DOCD: CPT | Mod: CPTII,S$GLB,, | Performed by: PODIATRIST

## 2024-07-30 PROCEDURE — 99214 OFFICE O/P EST MOD 30 MIN: CPT | Mod: 25,S$GLB,, | Performed by: PODIATRIST

## 2024-07-30 PROCEDURE — 99999 PR PBB SHADOW E&M-EST. PATIENT-LVL IV: CPT | Mod: PBBFAC,,, | Performed by: PODIATRIST

## 2024-07-30 PROCEDURE — 3075F SYST BP GE 130 - 139MM HG: CPT | Mod: CPTII,S$GLB,, | Performed by: PODIATRIST

## 2024-07-30 PROCEDURE — 1101F PT FALLS ASSESS-DOCD LE1/YR: CPT | Mod: CPTII,S$GLB,, | Performed by: PODIATRIST

## 2024-07-30 PROCEDURE — 1125F AMNT PAIN NOTED PAIN PRSNT: CPT | Mod: CPTII,S$GLB,, | Performed by: PODIATRIST

## 2024-07-30 RX ORDER — TRIAMCINOLONE ACETONIDE 40 MG/ML
80 INJECTION, SUSPENSION INTRA-ARTICULAR; INTRAMUSCULAR
Status: COMPLETED | OUTPATIENT
Start: 2024-07-30 | End: 2024-07-30

## 2024-07-30 RX ORDER — KETOROLAC TROMETHAMINE 30 MG/ML
60 INJECTION, SOLUTION INTRAMUSCULAR; INTRAVENOUS
Status: COMPLETED | OUTPATIENT
Start: 2024-07-30 | End: 2024-07-30

## 2024-07-30 RX ADMIN — KETOROLAC TROMETHAMINE 60 MG: 30 INJECTION, SOLUTION INTRAMUSCULAR; INTRAVENOUS at 11:07

## 2024-07-30 RX ADMIN — TRIAMCINOLONE ACETONIDE 80 MG: 40 INJECTION, SUSPENSION INTRA-ARTICULAR; INTRAMUSCULAR at 11:07

## 2024-08-03 PROBLEM — D21.20 FIBROMA OF FOOT: Status: ACTIVE | Noted: 2024-08-03

## 2024-08-03 NOTE — PROGRESS NOTES
Subjective:       Patient ID: Tye Diaz is a 79 y.o. female.    Chief Complaint: Foot Pain, Plantar Fasciitis, and Heel Pain  Presents today for follow-up plantar fasciitis bilateral.  Past Medical History:   Diagnosis Date    Cancer     vaginal cancer    Cataract     Hypertension     Inflammatory bowel disease     Kidney stone     Peristomal hernia 12/29/2017    Urinary tract infection     Vaginal cancer 1993    radiation treatment for clear cell cancer    Vaginal infection     Vitamin D deficiency disease     Well woman exam with routine gynecological exam 5/12/2016     Past Surgical History:   Procedure Laterality Date    APPENDECTOMY      BLADDER SURGERY      CATARACT EXTRACTION Bilateral     COLOSTOMY      CYSTOSCOPY      HAND TENDON SURGERY Right     HERNIA REPAIR  8/2009    peristomal     hyperbarics      HYSTERECTOMY  1980's    with unilateral salpingo oophrectomy    KIDNEY STONE SURGERY      OOPHORECTOMY      pelvic exenteration      OR REMOVAL OF OVARY/TUBE(S)      2yrs after judy/uso/other ovary was removed    radiation      SMALL INTESTINE SURGERY      URETERAL STENT PLACEMENT  1998     Family History   Problem Relation Name Age of Onset    Colon cancer Mother  50    Ovarian cancer Neg Hx      Uterine cancer Neg Hx      Breast cancer Neg Hx       Social History     Socioeconomic History    Marital status:    Tobacco Use    Smoking status: Some Days     Current packs/day: 0.25     Average packs/day: 0.3 packs/day for 34.0 years (8.5 ttl pk-yrs)     Types: Cigarettes    Smokeless tobacco: Never   Substance and Sexual Activity    Alcohol use: Yes     Alcohol/week: 1.0 standard drink of alcohol     Types: 1 Shots of liquor per week     Comment: daily    Drug use: No       Current Outpatient Medications   Medication Sig Dispense Refill    alprazolam (XANAX) 0.5 MG tablet 0.5 mg nightly as needed. Takes 1/2 tablets prn      benazepril-hydrochlorthiazide (LOTENSIN HCT) 20-12.5 mg per tablet Take 1  "tablet by mouth.      calcium-vitamin D3 (OS-BLOSSOM 500 + D3) 500 mg-5 mcg (200 unit) per tablet Take 1 tablet by mouth once daily.      cyclobenzaprine (FLEXERIL) 5 MG tablet       diclofenac (VOLTAREN) 75 MG EC tablet Take 1 tablet (75 mg total) by mouth 2 (two) times daily. 60 tablet 0    HYDROcodone-acetaminophen (NORCO) 5-325 mg per tablet Take 1 tablet by mouth every 8 (eight) hours as needed for Pain. 12 tablet 0    methenamine (HIPREX) 1 gram Tab       pregabalin (LYRICA) 150 MG capsule 150 mg.      SYNTHROID 50 mcg tablet Take 50 mcg by mouth once daily.       No current facility-administered medications for this visit.     Review of patient's allergies indicates:  No Known Allergies    Review of Systems   Musculoskeletal:  Positive for arthralgias.   All other systems reviewed and are negative.      Objective:      Vitals:    07/30/24 1053   BP: 131/76   BP Location: Right arm   Patient Position: Sitting   Pulse: 64   Weight: 64.9 kg (143 lb 1.3 oz)   Height: 5' 8" (1.727 m)     Physical Exam  Vitals and nursing note reviewed.   Constitutional:       Appearance: Normal appearance.   Cardiovascular:      Pulses:           Dorsalis pedis pulses are 1+ on the right side and 1+ on the left side.        Posterior tibial pulses are 1+ on the right side and 1+ on the left side.   Pulmonary:      Effort: Pulmonary effort is normal.   Musculoskeletal:         General: Swelling and tenderness present.        Feet:    Feet:      Right foot:      Protective Sensation: 3 sites tested.  3 sites sensed.      Skin integrity: Erythema and warmth present.      Left foot:      Protective Sensation: 3 sites tested.  3 sites sensed.      Skin integrity: Erythema and warmth present.   Skin:     Capillary Refill: Capillary refill takes 2 to 3 seconds.      Findings: Erythema present.   Neurological:      General: No focal deficit present.      Mental Status: She is alert.   Psychiatric:         Mood and Affect: Mood normal.         " Behavior: Behavior normal.                                                                      Assessment:       1. Plantar fasciitis    2. Fibroma of foot, unspecified laterality        Plan:       Patient presents for follow-up plantar fasciitis bilateral.  Patient states she was good for about 1 month and then her discomfort returned in both of her heels left greater than right.  Patient has palpable fibroma bilateral.  The majority of the patient's discomfort is in the heel at the plantar fascial insertion not over the fibroma.  I did discuss with the patient at length and in detail making sure that she has appropriate support at all times the blue pads that she was using were working very well clearly they are not giving her quite enough support the fact that she is having some plantar fascial pain still indicates this.  Patient will continue to use the blue arch pads that I have dispensed to her I gave her some new blue arch pads we also fitted the patient for and dispensed compression sleeves to be worn bilateral this is going to give her more support it will also help to prevent the swelling I do want her to continue to take the diclofenac as directed at least for the next couple of weeks I have recommended Oofos as a good option for something to wear in her house that is well supportive along the lines of flip-flops or slides patient was also administered an IM injection of Kenalog IM injection of Toradol for generalized inflammation I plan to re-evaluate her in 2 weeks.  Patient was in understanding and agreement with everything discussed I have encouraged the patient to bring some additional shoes in with her for me to evaluate.  This note was created using [employer] voice recognition software that occasionally misinterpreted phrases or words.

## 2024-08-13 ENCOUNTER — OFFICE VISIT (OUTPATIENT)
Dept: PODIATRY | Facility: CLINIC | Age: 79
End: 2024-08-13
Payer: MEDICARE

## 2024-08-13 VITALS
BODY MASS INDEX: 21.68 KG/M2 | DIASTOLIC BLOOD PRESSURE: 59 MMHG | HEIGHT: 68 IN | WEIGHT: 143.06 LBS | SYSTOLIC BLOOD PRESSURE: 107 MMHG | HEART RATE: 71 BPM

## 2024-08-13 DIAGNOSIS — M72.2 PLANTAR FASCIITIS: Primary | ICD-10-CM

## 2024-08-13 DIAGNOSIS — D21.20 FIBROMA OF FOOT, UNSPECIFIED LATERALITY: ICD-10-CM

## 2024-08-13 PROCEDURE — 3074F SYST BP LT 130 MM HG: CPT | Mod: CPTII,S$GLB,, | Performed by: PODIATRIST

## 2024-08-13 PROCEDURE — 1160F RVW MEDS BY RX/DR IN RCRD: CPT | Mod: CPTII,S$GLB,, | Performed by: PODIATRIST

## 2024-08-13 PROCEDURE — 1159F MED LIST DOCD IN RCRD: CPT | Mod: CPTII,S$GLB,, | Performed by: PODIATRIST

## 2024-08-13 PROCEDURE — 3288F FALL RISK ASSESSMENT DOCD: CPT | Mod: CPTII,S$GLB,, | Performed by: PODIATRIST

## 2024-08-13 PROCEDURE — 99214 OFFICE O/P EST MOD 30 MIN: CPT | Mod: S$GLB,,, | Performed by: PODIATRIST

## 2024-08-13 PROCEDURE — 1101F PT FALLS ASSESS-DOCD LE1/YR: CPT | Mod: CPTII,S$GLB,, | Performed by: PODIATRIST

## 2024-08-13 PROCEDURE — 99999 PR PBB SHADOW E&M-EST. PATIENT-LVL III: CPT | Mod: PBBFAC,,, | Performed by: PODIATRIST

## 2024-08-13 PROCEDURE — 3078F DIAST BP <80 MM HG: CPT | Mod: CPTII,S$GLB,, | Performed by: PODIATRIST

## 2024-08-13 PROCEDURE — 1125F AMNT PAIN NOTED PAIN PRSNT: CPT | Mod: CPTII,S$GLB,, | Performed by: PODIATRIST

## 2024-08-15 NOTE — PROGRESS NOTES
Subjective:       Patient ID: Tye Diaz is a 79 y.o. female.    Chief Complaint: Plantar Fasciitis  Presents today for follow-up plantar fasciitis bilateral.  Past Medical History:   Diagnosis Date    Cancer     vaginal cancer    Cataract     Hypertension     Inflammatory bowel disease     Kidney stone     Peristomal hernia 12/29/2017    Urinary tract infection     Vaginal cancer 1993    radiation treatment for clear cell cancer    Vaginal infection     Vitamin D deficiency disease     Well woman exam with routine gynecological exam 5/12/2016     Past Surgical History:   Procedure Laterality Date    APPENDECTOMY      BLADDER SURGERY      CATARACT EXTRACTION Bilateral     COLOSTOMY      CYSTOSCOPY      HAND TENDON SURGERY Right     HERNIA REPAIR  8/2009    peristomal     hyperbarics      HYSTERECTOMY  1980's    with unilateral salpingo oophrectomy    KIDNEY STONE SURGERY      OOPHORECTOMY      pelvic exenteration      [place] REMOVAL OF OVARY/TUBE(S)      2yrs after judy/uso/other ovary was removed    radiation      SMALL INTESTINE SURGERY      URETERAL STENT PLACEMENT  1998     Family History   Problem Relation Name Age of Onset    Colon cancer Mother  50    Ovarian cancer Neg Hx      Uterine cancer Neg Hx      Breast cancer Neg Hx       Social History     Socioeconomic History    Marital status:    Tobacco Use    Smoking status: Some Days     Current packs/day: 0.25     Average packs/day: 0.3 packs/day for 34.0 years (8.5 ttl pk-yrs)     Types: Cigarettes    Smokeless tobacco: Never   Substance and Sexual Activity    Alcohol use: Yes     Alcohol/week: 1.0 standard drink of alcohol     Types: 1 Shots of liquor per week     Comment: daily    Drug use: No       Current Outpatient Medications   Medication Sig Dispense Refill    alprazolam (XANAX) 0.5 MG tablet 0.5 mg nightly as needed. Takes 1/2 tablets prn      benazepril-hydrochlorthiazide (LOTENSIN HCT) 20-12.5 mg per tablet Take 1 tablet by mouth.       "calcium-vitamin D3 (OS-BLOSSOM 500 + D3) 500 mg-5 mcg (200 unit) per tablet Take 1 tablet by mouth once daily.      cyclobenzaprine (FLEXERIL) 5 MG tablet       diclofenac (VOLTAREN) 75 MG EC tablet Take 1 tablet (75 mg total) by mouth 2 (two) times daily. 60 tablet 0    HYDROcodone-acetaminophen (NORCO) 5-325 mg per tablet Take 1 tablet by mouth every 8 (eight) hours as needed for Pain. 12 tablet 0    methenamine (HIPREX) 1 gram Tab       pregabalin (LYRICA) 150 MG capsule 150 mg.      SYNTHROID 50 mcg tablet Take 50 mcg by mouth once daily.       No current facility-administered medications for this visit.     Review of patient's allergies indicates:  No Known Allergies    Review of Systems   Musculoskeletal:  Positive for arthralgias.   All other systems reviewed and are negative.      Objective:      Vitals:    08/13/24 1301   BP: (!) 107/59   BP Location: Right arm   Patient Position: Sitting   Pulse: 71   Weight: 64.9 kg (143 lb 1.3 oz)   Height: 5' 8" (1.727 m)     Physical Exam  Vitals and nursing note reviewed.   Constitutional:       Appearance: Normal appearance.   Cardiovascular:      Pulses:           Dorsalis pedis pulses are 1+ on the right side and 1+ on the left side.        Posterior tibial pulses are 1+ on the right side and 1+ on the left side.   Pulmonary:      Effort: Pulmonary effort is normal.   Musculoskeletal:         General: Swelling and tenderness present.        Feet:    Feet:      Right foot:      Protective Sensation: 3 sites tested.  3 sites sensed.      Skin integrity: Erythema and warmth present.      Left foot:      Protective Sensation: 3 sites tested.  3 sites sensed.      Skin integrity: Erythema and warmth present.   Skin:     Capillary Refill: Capillary refill takes 2 to 3 seconds.      Findings: Erythema present.   Neurological:      General: No focal deficit present.      Mental Status: She is alert.   Psychiatric:         Mood and Affect: Mood normal.         Behavior: " Behavior normal.                    Assessment:       1. Plantar fasciitis    2. Fibroma of foot, unspecified laterality        Plan:       Patient presents for follow-up plantar fasciitis bilateral.  Patient relates she is continuing to have pain in the arch area and heel right greater than left she states the last steroid injection she got IM really did not help very much not like the initial injections which helped a lot.  Patient continues to use the blue arch pads she states even with the arch pads she is having pain she does have mild plantar fibroma in the arch bilateral however the patient's area significant discomfort is along the course of the plantar fascia and plantar fascial insertion bilateral right greater than left.  Patient indicated she has been going to physical therapy which I was not aware of she states they have been doing some stretching of her plantar fascia which I advised her I feel as over stretching and likely contributing to the plantar fascial pain she needs to stop doing this immediately.  Patient will continue taking the diclofenac I did fit the patient for and dispensed the patient some power step control arch supports to give her much more support than what she is currently getting with the blue arch pads I have advised her this should dramatically reduce the inflammation that she is having we may need to give her even more support than this I plan to re-evaluate her in 2 weeks we will see how well she responds to the power steps.  Patient advised to contact us with any problems questions or concerns prior to scheduled follow-up.  This note was created using [employer] voice recognition software that occasionally misinterpreted phrases or words.

## 2024-08-27 ENCOUNTER — OFFICE VISIT (OUTPATIENT)
Dept: PODIATRY | Facility: CLINIC | Age: 79
End: 2024-08-27
Payer: MEDICARE

## 2024-08-27 VITALS
HEIGHT: 68 IN | DIASTOLIC BLOOD PRESSURE: 79 MMHG | BODY MASS INDEX: 21.68 KG/M2 | SYSTOLIC BLOOD PRESSURE: 125 MMHG | WEIGHT: 143.06 LBS | HEART RATE: 69 BPM

## 2024-08-27 DIAGNOSIS — M72.2 PLANTAR FASCIITIS: Primary | ICD-10-CM

## 2024-08-27 PROCEDURE — 3288F FALL RISK ASSESSMENT DOCD: CPT | Mod: CPTII,S$GLB,, | Performed by: PODIATRIST

## 2024-08-27 PROCEDURE — 3074F SYST BP LT 130 MM HG: CPT | Mod: CPTII,S$GLB,, | Performed by: PODIATRIST

## 2024-08-27 PROCEDURE — 99999 PR PBB SHADOW E&M-EST. PATIENT-LVL IV: CPT | Mod: PBBFAC,,, | Performed by: PODIATRIST

## 2024-08-27 PROCEDURE — 1101F PT FALLS ASSESS-DOCD LE1/YR: CPT | Mod: CPTII,S$GLB,, | Performed by: PODIATRIST

## 2024-08-27 PROCEDURE — 1159F MED LIST DOCD IN RCRD: CPT | Mod: CPTII,S$GLB,, | Performed by: PODIATRIST

## 2024-08-27 PROCEDURE — 3078F DIAST BP <80 MM HG: CPT | Mod: CPTII,S$GLB,, | Performed by: PODIATRIST

## 2024-08-27 PROCEDURE — 1160F RVW MEDS BY RX/DR IN RCRD: CPT | Mod: CPTII,S$GLB,, | Performed by: PODIATRIST

## 2024-08-27 PROCEDURE — 1125F AMNT PAIN NOTED PAIN PRSNT: CPT | Mod: CPTII,S$GLB,, | Performed by: PODIATRIST

## 2024-08-27 PROCEDURE — 99213 OFFICE O/P EST LOW 20 MIN: CPT | Mod: S$GLB,,, | Performed by: PODIATRIST

## 2024-08-27 NOTE — PROGRESS NOTES
Subjective:       Patient ID: Tye Diaz is a 79 y.o. female.    Chief Complaint: Plantar Fasciitis  Presents today for follow-up plantar fasciitis bilateral.  Past Medical History:   Diagnosis Date    Cancer     vaginal cancer    Cataract     Hypertension     Inflammatory bowel disease     Kidney stone     Peristomal hernia 12/29/2017    Urinary tract infection     Vaginal cancer 1993    radiation treatment for clear cell cancer    Vaginal infection     Vitamin D deficiency disease     Well woman exam with routine gynecological exam 5/12/2016     Past Surgical History:   Procedure Laterality Date    APPENDECTOMY      BLADDER SURGERY      CATARACT EXTRACTION Bilateral     COLOSTOMY      CYSTOSCOPY      HAND TENDON SURGERY Right     HERNIA REPAIR  8/2009    peristomal     hyperbarics      HYSTERECTOMY  1980's    with unilateral salpingo oophrectomy    KIDNEY STONE SURGERY      OOPHORECTOMY      pelvic exenteration      [place] REMOVAL OF OVARY/TUBE(S)      2yrs after judy/uso/other ovary was removed    radiation      SMALL INTESTINE SURGERY      URETERAL STENT PLACEMENT  1998     Family History   Problem Relation Name Age of Onset    Colon cancer Mother  50    Ovarian cancer Neg Hx      Uterine cancer Neg Hx      Breast cancer Neg Hx       Social History     Socioeconomic History    Marital status:    Tobacco Use    Smoking status: Some Days     Current packs/day: 0.25     Average packs/day: 0.3 packs/day for 34.0 years (8.5 ttl pk-yrs)     Types: Cigarettes    Smokeless tobacco: Never   Substance and Sexual Activity    Alcohol use: Yes     Alcohol/week: 1.0 standard drink of alcohol     Types: 1 Shots of liquor per week     Comment: daily    Drug use: No       Current Outpatient Medications   Medication Sig Dispense Refill    alprazolam (XANAX) 0.5 MG tablet 0.5 mg nightly as needed. Takes 1/2 tablets prn      benazepril-hydrochlorthiazide (LOTENSIN HCT) 20-12.5 mg per tablet Take 1 tablet by mouth.       "calcium-vitamin D3 (OS-BLOSSOM 500 + D3) 500 mg-5 mcg (200 unit) per tablet Take 1 tablet by mouth once daily.      cyclobenzaprine (FLEXERIL) 5 MG tablet       HYDROcodone-acetaminophen (NORCO) 5-325 mg per tablet Take 1 tablet by mouth every 8 (eight) hours as needed for Pain. 12 tablet 0    methenamine (HIPREX) 1 gram Tab       pregabalin (LYRICA) 150 MG capsule 150 mg.      SYNTHROID 50 mcg tablet Take 50 mcg by mouth once daily.       No current facility-administered medications for this visit.     Review of patient's allergies indicates:  No Known Allergies    Review of Systems   Musculoskeletal:  Positive for arthralgias.   All other systems reviewed and are negative.      Objective:      Vitals:    08/27/24 1346   BP: 125/79   BP Location: Right arm   Patient Position: Sitting   Pulse: 69   Weight: 64.9 kg (143 lb 1.3 oz)   Height: 5' 8" (1.727 m)     Physical Exam  Vitals and nursing note reviewed.   Constitutional:       Appearance: Normal appearance.   Cardiovascular:      Pulses:           Dorsalis pedis pulses are 1+ on the right side and 1+ on the left side.        Posterior tibial pulses are 1+ on the right side and 1+ on the left side.   Pulmonary:      Effort: Pulmonary effort is normal.   Musculoskeletal:         General: Swelling and tenderness present.        Feet:    Feet:      Right foot:      Protective Sensation: 3 sites tested.  3 sites sensed.      Skin integrity: Erythema and warmth present.      Left foot:      Protective Sensation: 3 sites tested.  3 sites sensed.      Skin integrity: Erythema and warmth present.   Skin:     Capillary Refill: Capillary refill takes 2 to 3 seconds.      Findings: Erythema present.   Neurological:      General: No focal deficit present.      Mental Status: She is alert.   Psychiatric:         Mood and Affect: Mood normal.         Behavior: Behavior normal.                                  Assessment:       1. Plantar fasciitis        Plan:       Patient " presents for follow-up plantar fasciitis bilateral.  Patient states she is doing considerably better she states her right foot is 80% better her left foot is 90% better the power step arch supports with the additional arch support on the plantar arch have made a considerable difference.  Patient was wearing some very supportive flip-flops today I did add additional blue arch padding to these also patient states her feet feel very good when she is wearing these but she still has a little bit of discomfort.  Patient will finish taking her diclofenac as directed she has about 10 days left after that time as long as she is getting the appropriate amount of support she should be pain-free.  Patient was in understanding and agreement with everything discussed she understands she needs to wear good supportive shoes at all times of weight-bearing.  Patient will contact us for follow-up if she is not pain-free in 2-3 weeks.  Patient has no significant tenderness nor inflammation at the plantar fascial insertion bilateral.  This note was created using [employer] voice recognition software that occasionally misinterpreted phrases or words.

## 2024-10-08 ENCOUNTER — OFFICE VISIT (OUTPATIENT)
Dept: URGENT CARE | Facility: CLINIC | Age: 79
End: 2024-10-08
Payer: MEDICARE

## 2024-10-08 VITALS
HEART RATE: 62 BPM | HEIGHT: 68 IN | WEIGHT: 143 LBS | SYSTOLIC BLOOD PRESSURE: 127 MMHG | TEMPERATURE: 98 F | OXYGEN SATURATION: 97 % | RESPIRATION RATE: 18 BRPM | BODY MASS INDEX: 21.67 KG/M2 | DIASTOLIC BLOOD PRESSURE: 78 MMHG

## 2024-10-08 DIAGNOSIS — R22.0 LIP SWELLING: Primary | ICD-10-CM

## 2024-10-08 PROCEDURE — 99215 OFFICE O/P EST HI 40 MIN: CPT | Mod: S$GLB,,, | Performed by: NURSE PRACTITIONER

## 2024-10-08 NOTE — PROGRESS NOTES
"Subjective:      Patient ID: Tye Diaz is a 79 y.o. female.    Vitals:  height is 5' 8" (1.727 m) and weight is 64.9 kg (143 lb). Her oral temperature is 98 °F (36.7 °C). Her blood pressure is 127/78 and her pulse is 62. Her respiration is 18 and oxygen saturation is 97%.     Chief Complaint: No chief complaint on file.    78 yo female with c/o lip swelling. She explains that she woke up this morning at 7am and noticed that her top lip was swollen. She explains that she went to physical therapy at 10am and as she was leaving at 11am, she noticed that the right corner of her bottom lip was starting to swell. She denies any difficulty swallowing. She denies any rash, SOB, wheezing, stridor, difficulty breathing, nausea, vomiting, or dizziness. She denies any change in voice. She denies any other symptoms or complaints other than previously stated. She reports that she has taken 2 Benadryl PTA.    Edema  This is a new problem. The current episode started yesterday. The symptoms are aggravated by drinking.       HENT:          Lip swelling.   Skin:  Negative for erythema.      Objective:     Physical Exam   Constitutional: She is oriented to person, place, and time. She is cooperative.  Non-toxic appearance. She does not appear ill. No distress. normalawake  HENT:   Head: Normocephalic and atraumatic.   Nose: Nose normal.   Mouth/Throat: Uvula is midline and mucous membranes are normal. Mucous membranes are moist. No uvula swelling. No posterior oropharyngeal edema or posterior oropharyngeal erythema. Oropharynx is clear.      Comments: Mild swelling of upper lip. Mild swelling to right corner of bottom lip.  Eyes: Conjunctivae are normal. Extraocular movement intact   Neck: Neck supple. No neck rigidity present.   Cardiovascular: Normal rate, regular rhythm, normal heart sounds and normal pulses.   Pulmonary/Chest: Effort normal and breath sounds normal.   Abdominal: Normal appearance.   Musculoskeletal: Normal " range of motion.         General: No swelling. Normal range of motion.      Cervical back: She exhibits no tenderness.      Right lower leg: No edema.   Lymphadenopathy:     She has no cervical adenopathy.   Neurological: no focal deficit. She is alert and oriented to person, place, and time. She displays no weakness. Gait normal.   Skin: Skin is warm, dry, not diaphoretic and no rash. No erythema   Psychiatric: Her behavior is normal. Mood, judgment and thought content normal.   Vitals reviewed.      Assessment:     1. Lip swelling        Plan:       Lip swelling      INSTRUCTIONS  To ER now for further evaluation and treatment.